# Patient Record
Sex: MALE | Race: WHITE | NOT HISPANIC OR LATINO
[De-identification: names, ages, dates, MRNs, and addresses within clinical notes are randomized per-mention and may not be internally consistent; named-entity substitution may affect disease eponyms.]

---

## 2019-04-08 ENCOUNTER — TRANSCRIPTION ENCOUNTER (OUTPATIENT)
Age: 64
End: 2019-04-08

## 2021-10-29 ENCOUNTER — EMERGENCY (EMERGENCY)
Facility: HOSPITAL | Age: 66
LOS: 1 days | Discharge: HOME | End: 2021-10-29
Attending: EMERGENCY MEDICINE
Payer: MEDICARE

## 2021-10-29 VITALS
WEIGHT: 184.09 LBS | RESPIRATION RATE: 18 BRPM | OXYGEN SATURATION: 96 % | TEMPERATURE: 99 F | HEIGHT: 69 IN | HEART RATE: 76 BPM | DIASTOLIC BLOOD PRESSURE: 81 MMHG | SYSTOLIC BLOOD PRESSURE: 161 MMHG

## 2021-10-29 PROCEDURE — 29125 APPL SHORT ARM SPLINT STATIC: CPT | Mod: LT

## 2021-10-29 PROCEDURE — 99284 EMERGENCY DEPT VISIT MOD MDM: CPT | Mod: 25

## 2021-10-29 RX ORDER — TETANUS TOXOID, REDUCED DIPHTHERIA TOXOID AND ACELLULAR PERTUSSIS VACCINE, ADSORBED 5; 2.5; 8; 8; 2.5 [IU]/.5ML; [IU]/.5ML; UG/.5ML; UG/.5ML; UG/.5ML
0.5 SUSPENSION INTRAMUSCULAR ONCE
Refills: 0 | Status: COMPLETED | OUTPATIENT
Start: 2021-10-29 | End: 2021-10-29

## 2021-10-29 RX ORDER — IBUPROFEN 200 MG
600 TABLET ORAL ONCE
Refills: 0 | Status: COMPLETED | OUTPATIENT
Start: 2021-10-29 | End: 2021-10-29

## 2021-10-29 RX ADMIN — Medication 600 MILLIGRAM(S): at 23:55

## 2021-10-29 RX ADMIN — TETANUS TOXOID, REDUCED DIPHTHERIA TOXOID AND ACELLULAR PERTUSSIS VACCINE, ADSORBED 0.5 MILLILITER(S): 5; 2.5; 8; 8; 2.5 SUSPENSION INTRAMUSCULAR at 23:55

## 2021-10-29 NOTE — ED PROVIDER NOTE - PROGRESS NOTE DETAILS
ZARINA Maurice NOTE: Radial fx splinted, placed in sling, follow up ortho or hand surgeon outpatient  Pt stable for d/c, reports improvement, VSS, tolerating PO, ambulatory.  Discussion includes results, plan, proper medication use/side effects, and return precautions. Pt advised to f/u with PMD 1-2 days and specialists discussed.  Pt verbalized understanding/agreement of plan.

## 2021-10-29 NOTE — ED PROVIDER NOTE - CARE PLAN
1 Principal Discharge DX:	Closed fracture of shaft of left radius  Secondary Diagnosis:	Elevated blood pressure reading

## 2021-10-29 NOTE — ED PROVIDER NOTE - OBJECTIVE STATEMENT
64 y/o M with PMHx HTN presents to ED c/o left wrist pain s/p FOOSH occurring tonight after slipping on stairs. Witnessed by wife. Denies head trauma, neck pain, back pain, syncope, LOC. No other acute injuries. Last known TDAP > 10 years ago. No analgesics taken prior to arrival.

## 2021-10-29 NOTE — ED PROVIDER NOTE - ATTENDING CONTRIBUTION TO CARE
I personally saw the patient with the PA, and completed the key components of the history and physical exam. I then discussed the management plan with the PA.   gen in nad resp clear cardoiac no murmur abd soft msk + ttp left wrist nrom neurovasc intact   agree with pa plan of care

## 2021-10-29 NOTE — ED PROVIDER NOTE - NSFOLLOWUPINSTRUCTIONS_ED_ALL_ED_FT
- Please follow up with your Primary Care Doctor in 1 - 2 days. If you cannot follow-up with your primary care doctor please return to the Emergency Department for any urgent issues.  - Seek immediate medical care for any new, worsening or concerning signs or symptoms.   - Take medications as directed, be sure to read all instructions on packaging  - If you have difficulty following up, please call: [phone] or go to [email or web address] to obtain a [facility] doctor or specialist who takes your insurance in your area.    Feel better!       Radial Fracture       A radial fracture is a break in the radius bone. The radius is a bone in the forearm, on the same side as the thumb. The forearm is the part of the arm that is between the elbow and the wrist. A radial fracture near the wrist (distal radialfracture) is the most common type of broken arm. A fracture can also occur near the elbow (radial head fracture).      What are the causes?  The most common cause of a radial fracture is falling with the arm outstretched. Other causes include:  •An accident, such as a car or bike accident.      •A hard, direct hit to the forearm.        What increases the risk?  You may be at greater risk for a radial fracture if you:  •Are female.      •Are an older adult.      •Play contact sports.      •Have a condition that causes your bones to become thin and brittle (osteoporosis).        What are the signs or symptoms?  A radial fracture causes pain immediately after the injury. Other signs and symptoms may include:  •An abnormal bend or bump in the arm (deformity).      •Swelling.      •Bruising.      •Numbness or tingling in your arm and hand.      •Limited movement of your arm and hand.        How is this diagnosed?  This condition may be diagnosed based on:  •Your symptoms and medical history.      •A physical exam.      •An X-ray.        How is this treated?  Treatment depends on how severe your fracture is, where it is, and how the pieces of the broken bone line up with each other (alignment).  •The first step may be for you to wear a temporary splint for a few days, until your swelling goes down. After the swelling goes down, you may get a cast, get a different type of splint, or have surgery.      •If your broken bone is in good alignment, you will need to wear a splint or cast for up to 6 weeks.    •If your broken bone is not aligned (is displaced), your health care provider will need to align the bone pieces. After alignment, you will need to wear a splint or cast for up to 6 weeks. To align your broken bone, your health care provider may:  •Move the bones back into position without surgery (closed reduction).      •Perform surgery to align the fracture and fix the bone pieces into place with metal screws, plates, or wires (open reduction and internal fixation, ORIF).      •Perform surgery to align the fracture and fix the bone pieces into place with pins that are attached to a stabilizing bar outside your skin (external fixation).      Treatment may also include:  •Having your cast changed after 2–3 weeks.      •Physical therapy.      •Follow-up visits and X-rays to make sure you are healing.        Follow these instructions at home:    If you have a splint:     •Wear it as told by your health care provider. Remove it only as told by your health care provider.       •Loosen the splint if your fingers tingle, become numb, or turn cold and blue.       •Keep the splint clean and dry.      If you have a cast:     • Do not stick anything inside the cast to scratch your skin. Doing that increases your risk for infection.       •Check the skin around the cast every day. Tell your health care provider about any concerns.       •You may put lotion on dry skin around the edges of the cast. Do not put lotion on the skin underneath the cast.      •Keep the cast clean and dry.      Bathing     • Do not take baths, swim, or use a hot tub until your health care provider approves. Ask your health care provider if you may take showers. You may only be allowed to take sponge baths.    •If your splint or cast is not waterproof:  •Do not let it get wet.      •Cover it with a watertight covering when you take a bath or a shower.        Activity     •  Do not lift anything with your injured arm.      • Do not use the injured arm to support your body weight until your health care provider says that you can.      •Ask your health care provider what activities are safe for you during recovery, and ask what activities you need to avoid.        Managing pain, stiffness, and swelling    •If directed, put ice on painful areas:   •If you have a removable splint, remove it as told by your health care provider.       •Put ice in a plastic bag.       •Place a towel between your skin and the bag, or between your cast and the bag.      •Leave the ice on for 20 minutes, 2–3 times a day.        • Move your fingers often to avoid stiffness and to lessen swelling.       •Raise (elevate) your arm above the level of your heart while you are sitting or lying down.      General instructions     • Do not put pressure on any part of the cast or splint until it is fully hardened, if applicable. This may take several hours.       •Take over-the-counter and prescription medicines only as told by your health care provider.      •  Do not drive until your health care provider approves. You should not drive or use heavy machinery while taking prescription pain medicine.       • Do not use any products that contain nicotine or tobacco, such as cigarettes and e-cigarettes. These can delay bone healing. If you need help quitting, ask your health care provider.      •Keep all follow-up visits as told by your health care provider. This is important.        Contact a health care provider if you have:    •Pain that does not get better with medicine.      •Swelling that gets worse.      •A bad smell coming from your cast.        Get help right away if:    •You cannot move your fingers.      •You have severe pain.    •Your fingers or your hand:  •Become numb, cold, or pale.      •Turn a bluish color.          Summary    •A radial fracture is a break in the radius bone. The radius is in the forearm, on the same side as the thumb.      •Treatment depends on how severe your fracture is, where it is, and how the pieces of the broken bone line up with each other.      •A splint or cast may be needed to help the fracture heal. A more severe break may require surgery.      This information is not intended to replace advice given to you by your health care provider. Make sure you discuss any questions you have with your health care provider. - Please follow up with your Primary Care Doctor in 1 - 2 days. If you cannot follow-up with your primary care doctor please return to the Emergency Department for any urgent issues.  - Seek immediate medical care for any new, worsening or concerning signs or symptoms.   - Take medications as directed, be sure to read all instructions on packaging  - If you have difficulty following up, please call: [phone] or go to [email or web address] to obtain a [facility] doctor or specialist who takes your insurance in your area.    Feel better!       Radial Fracture       A radial fracture is a break in the radius bone. The radius is a bone in the forearm, on the same side as the thumb. The forearm is the part of the arm that is between the elbow and the wrist. A radial fracture near the wrist (distal radialfracture) is the most common type of broken arm. A fracture can also occur near the elbow (radial head fracture).      What are the causes?  The most common cause of a radial fracture is falling with the arm outstretched. Other causes include:  •An accident, such as a car or bike accident.      •A hard, direct hit to the forearm.        What increases the risk?  You may be at greater risk for a radial fracture if you:  •Are female.      •Are an older adult.      •Play contact sports.      •Have a condition that causes your bones to become thin and brittle (osteoporosis).        What are the signs or symptoms?  A radial fracture causes pain immediately after the injury. Other signs and symptoms may include:  •An abnormal bend or bump in the arm (deformity).      •Swelling.      •Bruising.      •Numbness or tingling in your arm and hand.      •Limited movement of your arm and hand.        How is this diagnosed?  This condition may be diagnosed based on:  •Your symptoms and medical history.      •A physical exam.      •An X-ray.        How is this treated?  Treatment depends on how severe your fracture is, where it is, and how the pieces of the broken bone line up with each other (alignment).  •The first step may be for you to wear a temporary splint for a few days, until your swelling goes down. After the swelling goes down, you may get a cast, get a different type of splint, or have surgery.      •If your broken bone is in good alignment, you will need to wear a splint or cast for up to 6 weeks.    •If your broken bone is not aligned (is displaced), your health care provider will need to align the bone pieces. After alignment, you will need to wear a splint or cast for up to 6 weeks. To align your broken bone, your health care provider may:  •Move the bones back into position without surgery (closed reduction).      •Perform surgery to align the fracture and fix the bone pieces into place with metal screws, plates, or wires (open reduction and internal fixation, ORIF).      •Perform surgery to align the fracture and fix the bone pieces into place with pins that are attached to a stabilizing bar outside your skin (external fixation).      Treatment may also include:  •Having your cast changed after 2–3 weeks.      •Physical therapy.      •Follow-up visits and X-rays to make sure you are healing.        Follow these instructions at home:    If you have a splint:     •Wear it as told by your health care provider. Remove it only as told by your health care provider.       •Loosen the splint if your fingers tingle, become numb, or turn cold and blue.       •Keep the splint clean and dry.      If you have a cast:     • Do not stick anything inside the cast to scratch your skin. Doing that increases your risk for infection.       •Check the skin around the cast every day. Tell your health care provider about any concerns.       •You may put lotion on dry skin around the edges of the cast. Do not put lotion on the skin underneath the cast.      •Keep the cast clean and dry.      Bathing     • Do not take baths, swim, or use a hot tub until your health care provider approves. Ask your health care provider if you may take showers. You may only be allowed to take sponge baths.    •If your splint or cast is not waterproof:  •Do not let it get wet.      •Cover it with a watertight covering when you take a bath or a shower.        Activity     •  Do not lift anything with your injured arm.      • Do not use the injured arm to support your body weight until your health care provider says that you can.      •Ask your health care provider what activities are safe for you during recovery, and ask what activities you need to avoid.        Managing pain, stiffness, and swelling    •If directed, put ice on painful areas:   •If you have a removable splint, remove it as told by your health care provider.       •Put ice in a plastic bag.       •Place a towel between your skin and the bag, or between your cast and the bag.      •Leave the ice on for 20 minutes, 2–3 times a day.        • Move your fingers often to avoid stiffness and to lessen swelling.       •Raise (elevate) your arm above the level of your heart while you are sitting or lying down.      General instructions     • Do not put pressure on any part of the cast or splint until it is fully hardened, if applicable. This may take several hours.       •Take over-the-counter and prescription medicines only as told by your health care provider.      •  Do not drive until your health care provider approves. You should not drive or use heavy machinery while taking prescription pain medicine.       • Do not use any products that contain nicotine or tobacco, such as cigarettes and e-cigarettes. These can delay bone healing. If you need help quitting, ask your health care provider.      •Keep all follow-up visits as told by your health care provider. This is important.        Contact a health care provider if you have:    •Pain that does not get better with medicine.      •Swelling that gets worse.      •A bad smell coming from your cast.        Get help right away if:    •You cannot move your fingers.      •You have severe pain.    •Your fingers or your hand:  •Become numb, cold, or pale.      •Turn a bluish color.          Summary    •A radial fracture is a break in the radius bone. The radius is in the forearm, on the same side as the thumb.      •Treatment depends on how severe your fracture is, where it is, and how the pieces of the broken bone line up with each other.      •A splint or cast may be needed to help the fracture heal. A more severe break may require surgery.      This information is not intended to replace advice given to you by your health care provider. Make sure you discuss any questions you have with your health care provider.    - Your blood pressure reading was high while in ED, please monitor your blood pressure at home and follow up with your Primary Doctor    Hypertension, commonly called high blood pressure, is when the force of blood pumping through your arteries is too strong. Hypertension forces your heart to work harder to pump blood. Your arteries may become narrow or stiff. Having untreated or uncontrolled hypertension for a long period of time can cause heart attack, stroke, kidney disease, and other problems. If started on a medication, take exactly as prescribed by your health care professional.     SEEK IMMEDIATE MEDICAL CARE IF YOU HAVE ANY OF THE FOLLOWING SYMPTOMS: severe headache, changes in your vision, confusion, chest pain, abdominal pain, vomiting, or shortness of breath.

## 2021-10-29 NOTE — ED PROVIDER NOTE - PHYSICAL EXAMINATION
Vital signs noted, see flowsheet.  General: NAD, well appearing and non-toxic.  HEENT: NC/AT. MMM. No hemotympanum, Conjunctiva and sclera clear b/l.  EOMI. PERRL.  Neck: Soft and supple, full ROM without pain. No c-spine ttp   Cardiac: RRR. +S1/S2. Peripheral pulses 2+ and symmetric b/l.   Respiratory: Speaking in full sentences, no evidence of respiratory distress. Lungs CTA b/l, no wheezes/rhonchi/rales/stridor. No chest wall ttp   Abdomen: Soft, NTND. No guarding or rebound tenderness. No bruising. Pelvis stable   Back: Spine midline and non-tender. No CVAT.  Skin: Normal color for race, no evidence of laceration, rash, ecchymosis, cyanosis or jaundice.   Neuro: Awake, alert and oriented to person/place/time/situation. Moves all extremities spontaneously.  No focal deficits   Psych: Normal affect     LUE: Swelling to distal wrist, tenderness to distal wrist, volar wrist small abrasion no active bleeding, no snuffbox ttp, limited ROM of wrist secondary to pain, able to range elbow and digits. No ttp of elbow/digits. Sensation intact. Cap refill < 2 seconds

## 2021-10-29 NOTE — ED PROVIDER NOTE - PATIENT PORTAL LINK FT
You can access the FollowMyHealth Patient Portal offered by [facility] by registering at the following website: [email or web address]. By joining [facility]’s FollowMyHealth portal, you will also be able to view your health information using other applications (apps) compatible with our system.

## 2021-10-29 NOTE — ED PROVIDER NOTE - CARE PROVIDER_API CALL
Abiodun Mackey (MD)  Plastic Surgery; Surgery of the Hand  [address], [address]  [place] [address]  Phone: [phone]  Fax: [phone]  Follow Up Time: 4-6 Days

## 2021-10-29 NOTE — ED PROVIDER NOTE - CLINICAL SUMMARY MEDICAL DECISION MAKING FREE TEXT BOX
66 y/o M with PMHx HTN presents to ED c/o left wrist pain s/p FOOSH occurring tonight after slipping on stairs  -Will check Xray, give motrin, update TDAP

## 2021-10-29 NOTE — ED ADULT NURSE NOTE - OBJECTIVE STATEMENT
patient A&Ox3 in no acute distress c/o of L wrist pain and small abrasion patient had a fall on the stairs denied hitting head no LOC

## 2021-10-30 DIAGNOSIS — R03.0 ELEVATED BLOOD-PRESSURE READING, WITHOUT DIAGNOSIS OF HYPERTENSION: ICD-10-CM

## 2021-10-30 PROCEDURE — 73110 X-RAY EXAM OF WRIST: CPT | Mod: 26,LT

## 2021-10-30 PROCEDURE — 29125 APPL SHORT ARM SPLINT STATIC: CPT | Mod: LT

## 2021-10-30 PROCEDURE — 90471 IMMUNIZATION ADMIN: CPT

## 2021-10-30 PROCEDURE — 99283 EMERGENCY DEPT VISIT LOW MDM: CPT | Mod: 25

## 2021-10-30 PROCEDURE — 90715 TDAP VACCINE 7 YRS/> IM: CPT

## 2021-10-30 PROCEDURE — 73110 X-RAY EXAM OF WRIST: CPT

## 2021-10-30 RX ORDER — OXYCODONE AND ACETAMINOPHEN 5; 325 MG/1; MG/1
1 TABLET ORAL ONCE
Refills: 0 | Status: DISCONTINUED | OUTPATIENT
Start: 2021-10-30 | End: 2021-10-30

## 2021-10-30 RX ORDER — OXYCODONE HYDROCHLORIDE 5 MG/1
1 TABLET ORAL
Qty: 8 | Refills: 0
Start: 2021-10-30 | End: 2021-10-31

## 2021-10-30 RX ADMIN — OXYCODONE AND ACETAMINOPHEN 1 TABLET(S): 5; 325 TABLET ORAL at 00:58

## 2021-12-13 NOTE — ED PROVIDER NOTE - CARE PROVIDERS DIRECT ADDRESSES
DISCHARGE INSTRUCTIONS    1. Diet:  · Continue healthy diet  · Remember to stay well hydrated    2. Activity:  · Ok for light activities (walking, showering, etc) as tolerated  · Make an effort to be out of bed walking multiple times per day. Do not spend all day in bed or sleeping! Walking will reduce the chance of developing complications such as pneumonia or blood clots in your legs  · No heavy lifting >10 lbs, strenuous activity, or contact sports for at least 4 weeks following surgery, as these activities will increase risk of developing a hernia    3. Wound care / Showering:  · May shower with regular soap and water, but no soaking underwater (in pools, tubs, etc) until wounds fully healed and cleared by MD in clinic  · Skin glue: Allow the skin glue over your incisions to come off on its own. Glue will typically start to come off after 1-2 weeks. Ok to shower and lightly wash incisions but do not pick at them or peel the glue off prematurely    4. Driving:  · Do not drive while taking narcotic pain medications  · You may resume driving when you no longer require narcotics or sedating medications AND you are physically able to perform the movements necessary for safe driving (turning to check your blind spot, etc)    5. Medications:  · Prescription pain meds: Use narcotic (ie: opiod) pain meds if needed for severe pain only, but decrease the amount you use each day  · Over-the-counter pain meds: Unless instructed otherwise, ok to use over-the-counter meds such as tylenol (acetaminophen) and motrin (ibuprofen) as directed, BUT: Do not use motrin (or other \"NSAIDs\") more than 5 days in a row as this can cause bleeding / ulcers, and do not use tylenol at the same time as Percocet, Vicodin, or Norco as these medications contain tylenol  · Bowel regimen: Recommend using stool softeners (ex: colace) and / or light laxatives (ex: miralax or milk of magnesia) as needed until you resume regular bowel function,  especially if using narcotic pain medications which can cause constipation  · Continue other meds as directed in the \"Med Rec\" section of your discharge paperwork    6. Follow-up:  · In surgery clinic for post-op appointment in approximately two weeks. You are scheduled for your appointment on 12/28 at [facility] [address]. Please arrive 15 minutes prior to your appointment.   · Phone number for the surgery office at [facility] (office of Dr. Brown and colleagues) is [phone]  · Follow up with Primary Care Provider for ongoing treatment of other medical conditions and medication management  · Call MD or seek evaluation if you experience worsening pain, nausea/vomiting or trouble staying hydrated, inability to urinate or have a bowel movement, new redness, swelling, or foul-smelling drainage from incisions, fever (especially >101.5), questions about instructions, or any concern you do NOT feel is an emergency  · Call 911 or go to the nearest ER for difficulty breathing, passing-out / fainting (or feeling like you are about to pass-out), severe chest pain, massive bleeding from anywhere, or anything else you DO think may be an emergency         ,DirectAddress_Unknown

## 2022-06-02 ENCOUNTER — RESULT REVIEW (OUTPATIENT)
Age: 67
End: 2022-06-02

## 2022-11-02 ENCOUNTER — OFFICE (OUTPATIENT)
Dept: URBAN - METROPOLITAN AREA CLINIC 93 | Facility: CLINIC | Age: 67
Setting detail: OPHTHALMOLOGY
End: 2022-11-02
Payer: MEDICARE

## 2022-11-02 DIAGNOSIS — H43.811: ICD-10-CM

## 2022-11-02 DIAGNOSIS — H35.071: ICD-10-CM

## 2022-11-02 PROCEDURE — 92201 OPSCPY EXTND RTA DRAW UNI/BI: CPT | Performed by: OPHTHALMOLOGY

## 2022-11-02 PROCEDURE — 92235 FLUORESCEIN ANGRPH MLTIFRAME: CPT | Performed by: OPHTHALMOLOGY

## 2022-11-02 PROCEDURE — 99213 OFFICE O/P EST LOW 20 MIN: CPT | Performed by: OPHTHALMOLOGY

## 2022-11-02 ASSESSMENT — REFRACTION_MANIFEST
OD_ADD: +2.50
OS_CYLINDER: +2.50
OD_AXIS: 090
OS_ADD: +2.50
OD_SPHERE: +3.25
OS_AXIS: 090
OS_SPHERE: +3.00
OD_CYLINDER: +1.75

## 2022-11-02 ASSESSMENT — REFRACTION_AUTOREFRACTION
OS_CYLINDER: -2.25
OD_SPHERE: +5.50
OS_AXIS: 001
OS_SPHERE: +6.25
OD_CYLINDER: -1.75
OD_AXIS: 003

## 2022-11-02 ASSESSMENT — REFRACTION_CURRENTRX
OD_ADD: +1.75
OS_OVR_VA: 20/
OS_AXIS: 157
OS_CYLINDER: -1.75
OS_OVR_VA: 20/
OD_OVR_VA: 20/
OS_SPHERE: +2.75
OS_AXIS: 087
OS_SPHERE: +6.25
OD_AXIS: 097
OD_SPHERE: +6.25
OD_VPRISM_DIRECTION: PROGS
OD_CYLINDER: -2.00
OD_SPHERE: +3.25
OD_AXIS: 012
OS_VPRISM_DIRECTION: PROGS
OS_ADD: +2.00
OD_CYLINDER: +1.75
OD_OVR_VA: 20/
OS_CYLINDER: +2.50

## 2022-11-02 ASSESSMENT — SPHEQUIV_DERIVED
OS_SPHEQUIV: 5.125
OS_SPHEQUIV: 4.25
OD_SPHEQUIV: 4.125
OD_SPHEQUIV: 4.625

## 2022-11-02 ASSESSMENT — AXIALLENGTH_DERIVED
OD_AL: 22.3314
OS_AL: 22.247
OS_AL: 21.9479
OD_AL: 22.1582

## 2022-11-02 ASSESSMENT — KERATOMETRY
OS_AXISANGLE_DEGREES: 083
OD_K1POWER_DIOPTERS: 41.75
OS_K1POWER_DIOPTERS: 41.75
OD_AXISANGLE_DEGREES: 090
OS_K2POWER_DIOPTERS: 44.00
OD_K2POWER_DIOPTERS: 43.75

## 2022-11-02 ASSESSMENT — VISUAL ACUITY
OD_BCVA: 20/30+2
OS_BCVA: 20/20-1

## 2022-11-02 ASSESSMENT — CONFRONTATIONAL VISUAL FIELD TEST (CVF)
OD_FINDINGS: FULL
OS_FINDINGS: FULL

## 2022-11-02 ASSESSMENT — SUPERFICIAL PUNCTATE KERATITIS (SPK)
OS_SPK: 1+
OD_SPK: 2+

## 2023-01-06 PROBLEM — I10 ESSENTIAL (PRIMARY) HYPERTENSION: Chronic | Status: ACTIVE | Noted: 2021-10-29

## 2023-01-06 PROBLEM — Z00.00 ENCOUNTER FOR PREVENTIVE HEALTH EXAMINATION: Status: ACTIVE | Noted: 2023-01-06

## 2023-01-17 ENCOUNTER — APPOINTMENT (OUTPATIENT)
Dept: ORTHOPEDIC SURGERY | Facility: CLINIC | Age: 68
End: 2023-01-17
Payer: MEDICARE

## 2023-01-17 VITALS — HEIGHT: 70 IN | WEIGHT: 196 LBS | BODY MASS INDEX: 28.06 KG/M2

## 2023-01-17 DIAGNOSIS — Z78.9 OTHER SPECIFIED HEALTH STATUS: ICD-10-CM

## 2023-01-17 DIAGNOSIS — I10 ESSENTIAL (PRIMARY) HYPERTENSION: ICD-10-CM

## 2023-01-17 DIAGNOSIS — M72.0 PALMAR FASCIAL FIBROMATOSIS [DUPUYTREN]: ICD-10-CM

## 2023-01-17 DIAGNOSIS — E78.00 PURE HYPERCHOLESTEROLEMIA, UNSPECIFIED: ICD-10-CM

## 2023-01-17 PROCEDURE — 99203 OFFICE O/P NEW LOW 30 MIN: CPT

## 2023-01-17 RX ORDER — AMLODIPINE BESYLATE 5 MG/1
5 TABLET ORAL
Refills: 0 | Status: ACTIVE | COMMUNITY

## 2023-01-17 RX ORDER — SIMVASTATIN 40 MG/1
TABLET, FILM COATED ORAL
Refills: 0 | Status: ACTIVE | COMMUNITY

## 2023-01-17 NOTE — DISCUSSION/SUMMARY
[de-identified] : His surgery was over 1 year ago. he has completed therapy. Discussed he is likely at his final destination regarding his stiffness and motion, in fact his finger motion is excellent, near full. \par No intervention is needed for Danes at this time. \par PRN.

## 2023-01-17 NOTE — PHYSICAL EXAM
[Left] : left hand [] : no erythema [FreeTextEntry3] : Pretendinous chords to 2,3 and 4 \par No loss of extension\par All fingers reach DPC

## 2023-01-17 NOTE — REVIEW OF SYSTEMS
[Joint Stiffness] : joint stiffness [Joint Pain] : no joint pain [Joint Swelling] : no joint swelling

## 2023-01-17 NOTE — HISTORY OF PRESENT ILLNESS
[0] : 0 [Full time] : Work status: full time [de-identified] : 67 year old male presenting with LEFT wrist and finger stiffness. He reports he had a fx and had surgery for the fx November 2021.  [] : Post Surgical Visit: no [FreeTextEntry1] : L wrist  [FreeTextEntry3] : 10/2021 [FreeTextEntry5] : 68 Y/O LHD M eval L wrist. pain since DOI above due to HX of FX with associated ORIF most recent TX on 4/22/22. pt denies any pain  [de-identified] : None [de-identified] : [employer]

## 2023-01-24 ENCOUNTER — TRANSCRIPTION ENCOUNTER (OUTPATIENT)
Age: 68
End: 2023-01-24

## 2023-04-11 ENCOUNTER — OFFICE (OUTPATIENT)
Facility: LOCATION | Age: 68
Setting detail: OPHTHALMOLOGY
End: 2023-04-11
Payer: MEDICARE

## 2023-04-11 DIAGNOSIS — H25.13: ICD-10-CM

## 2023-04-11 PROCEDURE — 92012 INTRM OPH EXAM EST PATIENT: CPT | Performed by: OPHTHALMOLOGY

## 2023-04-11 ASSESSMENT — REFRACTION_CURRENTRX
OD_CYLINDER: +1.75
OD_CYLINDER: -2.00
OD_OVR_VA: 20/
OD_VPRISM_DIRECTION: PROGS
OS_CYLINDER: +2.50
OD_SPHERE: +6.25
OS_OVR_VA: 20/
OD_OVR_VA: 20/
OS_SPHERE: +6.25
OS_SPHERE: +2.75
OS_AXIS: 087
OD_AXIS: 097
OS_ADD: +2.00
OD_ADD: +1.75
OS_CYLINDER: -1.75
OD_SPHERE: +3.25
OD_AXIS: 012
OS_AXIS: 157
OS_VPRISM_DIRECTION: PROGS
OS_OVR_VA: 20/

## 2023-04-11 ASSESSMENT — KERATOMETRY
OD_K2POWER_DIOPTERS: 43.75
OS_AXISANGLE_DEGREES: 083
OS_K1POWER_DIOPTERS: 41.75
OD_K1POWER_DIOPTERS: 41.75
OS_K2POWER_DIOPTERS: 44.00
OD_AXISANGLE_DEGREES: 090

## 2023-04-11 ASSESSMENT — AXIALLENGTH_DERIVED
OS_AL: 22.247
OS_AL: 21.9479
OD_AL: 22.1582
OD_AL: 22.3314

## 2023-04-11 ASSESSMENT — REFRACTION_MANIFEST
OD_CYLINDER: +1.75
OS_SPHERE: +3.00
OS_ADD: +2.50
OD_ADD: +2.50
OS_CYLINDER: +2.50
OD_SPHERE: +3.25
OS_AXIS: 090
OD_AXIS: 090

## 2023-04-11 ASSESSMENT — CONFRONTATIONAL VISUAL FIELD TEST (CVF)
OD_FINDINGS: FULL
OS_FINDINGS: FULL

## 2023-04-11 ASSESSMENT — SPHEQUIV_DERIVED
OS_SPHEQUIV: 5.125
OD_SPHEQUIV: 4.125
OD_SPHEQUIV: 4.625
OS_SPHEQUIV: 4.25

## 2023-04-11 ASSESSMENT — VISUAL ACUITY
OD_BCVA: 20/25
OS_BCVA: 20/20-1

## 2023-04-11 ASSESSMENT — REFRACTION_AUTOREFRACTION
OD_AXIS: 003
OS_CYLINDER: -2.25
OD_SPHERE: +5.50
OS_AXIS: 001
OS_SPHERE: +6.25
OD_CYLINDER: -1.75

## 2023-04-11 ASSESSMENT — SUPERFICIAL PUNCTATE KERATITIS (SPK)
OS_SPK: 1+
OD_SPK: 2+

## 2023-10-11 ENCOUNTER — OFFICE (OUTPATIENT)
Facility: LOCATION | Age: 68
Setting detail: OPHTHALMOLOGY
End: 2023-10-11
Payer: MEDICARE

## 2023-10-11 DIAGNOSIS — H25.13: ICD-10-CM

## 2023-10-11 DIAGNOSIS — H16.223: ICD-10-CM

## 2023-10-11 PROCEDURE — 99212 OFFICE O/P EST SF 10 MIN: CPT | Performed by: OPHTHALMOLOGY

## 2023-10-11 ASSESSMENT — SUPERFICIAL PUNCTATE KERATITIS (SPK)
OS_SPK: 1+
OD_SPK: 2+

## 2023-10-11 ASSESSMENT — REFRACTION_MANIFEST
OD_SPHERE: +3.25
OD_ADD: +2.50
OD_CYLINDER: +1.75
OS_AXIS: 090
OS_SPHERE: +3.00
OD_AXIS: 090
OS_CYLINDER: +2.50
OS_ADD: +2.50

## 2023-10-11 ASSESSMENT — CONFRONTATIONAL VISUAL FIELD TEST (CVF)
OD_FINDINGS: FULL
OS_FINDINGS: FULL

## 2023-10-11 ASSESSMENT — AXIALLENGTH_DERIVED
OS_AL: 21.7853
OD_AL: 21.9925
OD_AL: 22.2494
OS_AL: 22.1656

## 2023-10-11 ASSESSMENT — REFRACTION_CURRENTRX
OS_SPHERE: +5.25
OD_ADD: +2.50
OS_CYLINDER: -2.25
OD_AXIS: 012
OD_SPHERE: +6.25
OD_VPRISM_DIRECTION: PROGS
OD_OVR_VA: 20/
OD_CYLINDER: -1.75
OD_OVR_VA: 20/
OS_CYLINDER: -1.75
OD_AXIS: 015
OS_ADD: +2.50
OS_OVR_VA: 20/
OS_VPRISM_DIRECTION: PROGS
OD_SPHERE: +5.00
OS_AXIS: 180
OS_OVR_VA: 20/
OS_AXIS: 157
OD_CYLINDER: -2.00
OS_SPHERE: +6.25

## 2023-10-11 ASSESSMENT — REFRACTION_AUTOREFRACTION
OS_AXIS: 004
OD_AXIS: 001
OD_CYLINDER: -1.75
OS_SPHERE: +6.00
OS_CYLINDER: -1.25
OD_SPHERE: +5.75

## 2023-10-11 ASSESSMENT — SPHEQUIV_DERIVED
OS_SPHEQUIV: 4.25
OD_SPHEQUIV: 4.875
OD_SPHEQUIV: 4.125
OS_SPHEQUIV: 5.375

## 2023-10-11 ASSESSMENT — KERATOMETRY
OS_AXISANGLE_DEGREES: 083
OS_K2POWER_DIOPTERS: 44.25
OD_AXISANGLE_DEGREES: 091
OD_K2POWER_DIOPTERS: 44.00
OD_K1POWER_DIOPTERS: 42.00
OS_K1POWER_DIOPTERS: 42.00

## 2023-10-11 ASSESSMENT — VISUAL ACUITY
OD_BCVA: 20/40+
OS_BCVA: 20/30

## 2024-10-21 ENCOUNTER — OFFICE (OUTPATIENT)
Facility: LOCATION | Age: 69
Setting detail: OPHTHALMOLOGY
End: 2024-10-21
Payer: MEDICARE

## 2024-10-21 DIAGNOSIS — H43.811: ICD-10-CM

## 2024-10-21 DIAGNOSIS — H35.071: ICD-10-CM

## 2024-10-21 DIAGNOSIS — H16.223: ICD-10-CM

## 2024-10-21 DIAGNOSIS — H25.13: ICD-10-CM

## 2024-10-21 PROCEDURE — 92250 FUNDUS PHOTOGRAPHY W/I&R: CPT | Performed by: OPHTHALMOLOGY

## 2024-10-21 PROCEDURE — 92014 COMPRE OPH EXAM EST PT 1/>: CPT | Performed by: OPHTHALMOLOGY

## 2024-10-21 ASSESSMENT — CONFRONTATIONAL VISUAL FIELD TEST (CVF)
OD_FINDINGS: FULL
OS_FINDINGS: FULL

## 2024-10-21 ASSESSMENT — REFRACTION_MANIFEST
OS_AXIS: 090
OS_CYLINDER: +2.50
OS_SPHERE: +3.00
OS_ADD: +2.50
OD_ADD: +2.50
OD_SPHERE: +3.25
OD_CYLINDER: +1.75
OD_AXIS: 090

## 2024-10-21 ASSESSMENT — KERATOMETRY
OS_AXISANGLE_DEGREES: 083
OS_K1POWER_DIOPTERS: 42.00
OS_K2POWER_DIOPTERS: 44.25
OD_AXISANGLE_DEGREES: 098
OD_K2POWER_DIOPTERS: 43.50
OD_K1POWER_DIOPTERS: 42.25

## 2024-10-21 ASSESSMENT — REFRACTION_CURRENTRX
OS_OVR_VA: 20/
OS_OVR_VA: 20/
OD_ADD: +2.25
OS_SPHERE: +5.25
OS_VPRISM_DIRECTION: PROGS
OD_OVR_VA: 20/
OS_AXIS: 178
OD_VPRISM_DIRECTION: PROGS
OS_CYLINDER: -2.00
OD_AXIS: 017
OD_CYLINDER: -1.75
OD_OVR_VA: 20/
OD_SPHERE: +5.00
OS_ADD: +2.25

## 2024-10-21 ASSESSMENT — REFRACTION_AUTOREFRACTION
OD_SPHERE: +5.50
OS_AXIS: 175
OD_CYLINDER: -2.00
OD_AXIS: 006
OS_CYLINDER: -2.00
OS_SPHERE: +5.75

## 2024-10-21 ASSESSMENT — SUPERFICIAL PUNCTATE KERATITIS (SPK)
OS_SPK: 1+
OD_SPK: 2+

## 2025-01-30 ENCOUNTER — OFFICE (OUTPATIENT)
Facility: LOCATION | Age: 70
Setting detail: OPHTHALMOLOGY
End: 2025-01-30
Payer: MEDICARE

## 2025-01-30 DIAGNOSIS — H25.11: ICD-10-CM

## 2025-01-30 DIAGNOSIS — H35.071: ICD-10-CM

## 2025-01-30 DIAGNOSIS — H25.13: ICD-10-CM

## 2025-01-30 DIAGNOSIS — H16.223: ICD-10-CM

## 2025-01-30 DIAGNOSIS — H43.811: ICD-10-CM

## 2025-01-30 PROBLEM — H25.12 CATARACT SENILE NUCLEAR SCLEROSIS; RIGHT EYE, LEFT EYE, BOTH EYES: Status: ACTIVE | Noted: 2025-01-30

## 2025-01-30 PROCEDURE — 92136 OPHTHALMIC BIOMETRY: CPT | Mod: 26,RT | Performed by: OPHTHALMOLOGY

## 2025-01-30 PROCEDURE — 92250 FUNDUS PHOTOGRAPHY W/I&R: CPT | Performed by: OPHTHALMOLOGY

## 2025-01-30 PROCEDURE — 92136 OPHTHALMIC BIOMETRY: CPT | Mod: TC | Performed by: OPHTHALMOLOGY

## 2025-01-30 PROCEDURE — 99214 OFFICE O/P EST MOD 30 MIN: CPT | Performed by: OPHTHALMOLOGY

## 2025-01-30 ASSESSMENT — REFRACTION_MANIFEST
OS_AXIS: 090
OS_ADD: +2.50
OD_ADD: +2.50
OD_CYLINDER: +1.75
OS_CYLINDER: +2.50
OS_SPHERE: +3.00
OD_SPHERE: +3.25
OD_AXIS: 090

## 2025-01-30 ASSESSMENT — REFRACTION_CURRENTRX
OD_AXIS: 015
OD_SPHERE: +5.00
OD_OVR_VA: 20/
OS_OVR_VA: 20/
OS_CYLINDER: -2.00
OS_SPHERE: +5.25
OD_ADD: +2.50
OD_VPRISM_DIRECTION: PROGS
OS_ADD: +2.50
OS_VPRISM_DIRECTION: PROGS
OS_AXIS: 180
OD_CYLINDER: -1.75

## 2025-01-30 ASSESSMENT — KERATOMETRY
OD_K1K2_AVERAGE: 42.875
OS_K1POWER_DIOPTERS: 42.00
OS_K1K2_AVERAGE: 43.125
OS_CYLPOWER_DEGREES: 2.25
OD_AXISANGLE_DEGREES: 098
OD_AXISANGLE_DEGREES: 8
OS_CYLAXISANGLE_DEGREES: 083
OD_K1POWER_DIOPTERS: 42.25
OS_AXISANGLE_DEGREES: 083
OS_AXISANGLE2_DEGREES: 083
OD_CYLPOWER_DEGREES: 1.25
OS_K2POWER_DIOPTERS: 44.25
OD_AXISANGLE2_DEGREES: 098
OS_AXISANGLE_DEGREES: 173
OD_K1POWER_DIOPTERS: 42.25
OD_K2POWER_DIOPTERS: 43.50
OD_CYLAXISANGLE_DEGREES: 098
OS_K2POWER_DIOPTERS: 44.25
OS_K1POWER_DIOPTERS: 42.00
OD_K2POWER_DIOPTERS: 43.50

## 2025-01-30 ASSESSMENT — REFRACTION_AUTOREFRACTION
OD_SPHERE: +5.25
OS_SPHERE: +6.00
OD_CYLINDER: -1.00
OD_AXIS: 180
OS_AXIS: 005
OS_CYLINDER: -1.25

## 2025-01-30 ASSESSMENT — CONFRONTATIONAL VISUAL FIELD TEST (CVF)
OD_FINDINGS: FULL
OS_FINDINGS: FULL

## 2025-01-30 ASSESSMENT — SUPERFICIAL PUNCTATE KERATITIS (SPK)
OD_SPK: 2+
OS_SPK: 1+

## 2025-02-04 ENCOUNTER — ASC (OUTPATIENT)
Dept: URBAN - METROPOLITAN AREA SURGERY 8 | Facility: SURGERY | Age: 70
Setting detail: OPHTHALMOLOGY
End: 2025-02-04
Payer: MEDICARE

## 2025-02-04 DIAGNOSIS — H52.221: ICD-10-CM

## 2025-02-04 DIAGNOSIS — H25.11: ICD-10-CM

## 2025-02-04 PROCEDURE — 66984 XCAPSL CTRC RMVL W/O ECP: CPT | Mod: RT | Performed by: OPHTHALMOLOGY

## 2025-02-04 PROCEDURE — FEMTO PRECISION LASER CATARACT SURGERY: Mod: GY | Performed by: OPHTHALMOLOGY

## 2025-02-04 PROCEDURE — S9986 NOT MEDICALLY NECESSARY SVC: HCPCS | Mod: GX,GY | Performed by: OPHTHALMOLOGY

## 2025-02-04 PROCEDURE — 68841 INSJ RX ELUT IMPLT LAC CANAL: CPT | Mod: RT | Performed by: OPHTHALMOLOGY

## 2025-02-05 ENCOUNTER — OFFICE (OUTPATIENT)
Facility: LOCATION | Age: 70
Setting detail: OPHTHALMOLOGY
End: 2025-02-05
Payer: MEDICARE

## 2025-02-05 ENCOUNTER — RX ONLY (RX ONLY)
Age: 70
End: 2025-02-05

## 2025-02-05 DIAGNOSIS — H25.12: ICD-10-CM

## 2025-02-05 PROCEDURE — 92136 OPHTHALMIC BIOMETRY: CPT | Mod: 26,LT | Performed by: OPHTHALMOLOGY

## 2025-02-05 PROCEDURE — 99214 OFFICE O/P EST MOD 30 MIN: CPT | Mod: 24 | Performed by: OPHTHALMOLOGY

## 2025-02-05 ASSESSMENT — KERATOMETRY
OD_K2POWER_DIOPTERS: 43.50
OS_AXISANGLE_DEGREES: 085
OS_K2POWER_DIOPTERS: 44.50
OS_K1POWER_DIOPTERS: 42.00
OD_K1POWER_DIOPTERS: 42.50
OD_AXISANGLE_DEGREES: 108

## 2025-02-05 ASSESSMENT — REFRACTION_AUTOREFRACTION
OS_AXIS: 175
OS_SPHERE: +6.00
OD_SPHERE: +0.50
OS_CYLINDER: -2.00
OD_CYLINDER: -1.00
OD_AXIS: 025

## 2025-02-05 ASSESSMENT — SUPERFICIAL PUNCTATE KERATITIS (SPK)
OS_SPK: 1+
OD_SPK: 2+

## 2025-02-05 ASSESSMENT — REFRACTION_CURRENTRX
OS_SPHERE: +5.25
OS_AXIS: 180
OS_ADD: +2.50
OS_OVR_VA: 20/
OD_CYLINDER: -1.75
OS_VPRISM_DIRECTION: PROGS
OD_ADD: +2.50
OD_SPHERE: +5.00
OD_OVR_VA: 20/
OD_AXIS: 015
OS_CYLINDER: -2.00
OD_VPRISM_DIRECTION: PROGS

## 2025-02-05 ASSESSMENT — REFRACTION_MANIFEST
OS_SPHERE: +3.00
OD_SPHERE: +3.25
OD_CYLINDER: +1.75
OD_ADD: +2.50
OS_ADD: +2.50
OD_AXIS: 090
OS_AXIS: 090
OS_CYLINDER: +2.50

## 2025-02-05 ASSESSMENT — VISUAL ACUITY
OS_BCVA: 20/25+2
OD_BCVA: 20/40+2

## 2025-02-05 ASSESSMENT — CONFRONTATIONAL VISUAL FIELD TEST (CVF)
OD_FINDINGS: FULL
OS_FINDINGS: FULL

## 2025-02-18 ENCOUNTER — ASC (OUTPATIENT)
Dept: URBAN - METROPOLITAN AREA SURGERY 8 | Facility: SURGERY | Age: 70
Setting detail: OPHTHALMOLOGY
End: 2025-02-18
Payer: MEDICARE

## 2025-02-18 DIAGNOSIS — H52.222: ICD-10-CM

## 2025-02-18 DIAGNOSIS — H25.12: ICD-10-CM

## 2025-02-18 PROCEDURE — 66984 XCAPSL CTRC RMVL W/O ECP: CPT | Mod: 79,LT | Performed by: OPHTHALMOLOGY

## 2025-02-18 PROCEDURE — S9986 NOT MEDICALLY NECESSARY SVC: HCPCS | Mod: GX,GY | Performed by: OPHTHALMOLOGY

## 2025-02-18 PROCEDURE — FEMTO PRECISION LASER CATARACT SURGERY: Mod: GY | Performed by: OPHTHALMOLOGY

## 2025-02-18 PROCEDURE — 68841 INSJ RX ELUT IMPLT LAC CANAL: CPT | Mod: 79,LT | Performed by: OPHTHALMOLOGY

## 2025-02-19 ENCOUNTER — RX ONLY (RX ONLY)
Age: 70
End: 2025-02-19

## 2025-02-19 ENCOUNTER — OFFICE (OUTPATIENT)
Facility: LOCATION | Age: 70
Setting detail: OPHTHALMOLOGY
End: 2025-02-19
Payer: MEDICARE

## 2025-02-19 DIAGNOSIS — Z96.1: ICD-10-CM

## 2025-02-19 PROCEDURE — 99024 POSTOP FOLLOW-UP VISIT: CPT | Performed by: OPHTHALMOLOGY

## 2025-02-19 ASSESSMENT — REFRACTION_AUTOREFRACTION
OD_AXIS: 025
OS_AXIS: 175
OS_CYLINDER: -2.00
OD_CYLINDER: -1.00
OD_SPHERE: +0.50
OS_SPHERE: +6.00

## 2025-02-19 ASSESSMENT — REFRACTION_CURRENTRX
OD_OVR_VA: 20/
OS_OVR_VA: 20/
OD_VPRISM_DIRECTION: PROGS
OD_AXIS: 015
OD_ADD: +2.50
OS_VPRISM_DIRECTION: PROGS
OD_CYLINDER: -1.75
OS_SPHERE: +5.25
OS_AXIS: 180
OS_ADD: +2.50
OD_SPHERE: +5.00
OS_CYLINDER: -2.00

## 2025-02-19 ASSESSMENT — KERATOMETRY
OD_AXISANGLE_DEGREES: 108
OS_AXISANGLE_DEGREES: 085
OD_K1POWER_DIOPTERS: 42.50
OS_K1POWER_DIOPTERS: 42.00
OD_K2POWER_DIOPTERS: 43.50
OS_K2POWER_DIOPTERS: 44.50

## 2025-02-19 ASSESSMENT — CONFRONTATIONAL VISUAL FIELD TEST (CVF)
OS_FINDINGS: FULL
OD_FINDINGS: FULL

## 2025-02-19 ASSESSMENT — CORNEAL EDEMA CLINICAL DESCRIPTION: OS_CORNEALEDEMA: 1+

## 2025-02-19 ASSESSMENT — VISUAL ACUITY
OD_BCVA: 20/40+2
OS_BCVA: 20/20-2

## 2025-02-19 ASSESSMENT — REFRACTION_MANIFEST
OS_CYLINDER: +2.50
OD_AXIS: 090
OS_ADD: +2.50
OS_SPHERE: +3.00
OS_AXIS: 090
OD_SPHERE: +3.25
OD_CYLINDER: +1.75
OD_ADD: +2.50

## 2025-02-19 ASSESSMENT — SUPERFICIAL PUNCTATE KERATITIS (SPK)
OS_SPK: 1+
OD_SPK: 2+

## 2025-03-19 ENCOUNTER — OFFICE (OUTPATIENT)
Facility: LOCATION | Age: 70
Setting detail: OPHTHALMOLOGY
End: 2025-03-19
Payer: MEDICARE

## 2025-03-19 DIAGNOSIS — Z96.1: ICD-10-CM

## 2025-03-19 DIAGNOSIS — H16.223: ICD-10-CM

## 2025-03-19 PROCEDURE — 99024 POSTOP FOLLOW-UP VISIT: CPT | Performed by: OPHTHALMOLOGY

## 2025-03-19 ASSESSMENT — REFRACTION_CURRENTRX
OS_ADD: +2.50
OD_VPRISM_DIRECTION: PROGS
OD_SPHERE: +5.00
OS_OVR_VA: 20/
OD_OVR_VA: 20/
OD_CYLINDER: -1.75
OS_VPRISM_DIRECTION: PROGS
OS_SPHERE: +5.25
OS_CYLINDER: -2.00
OD_AXIS: 015
OS_AXIS: 180
OD_ADD: +2.50

## 2025-03-19 ASSESSMENT — REFRACTION_MANIFEST
OS_ADD: +2.50
OD_AXIS: 090
OS_AXIS: 090
OS_CYLINDER: +2.50
OD_ADD: +2.50
OD_CYLINDER: +1.75
OD_SPHERE: +3.25
OS_SPHERE: +3.00

## 2025-03-19 ASSESSMENT — VISUAL ACUITY
OD_BCVA: 20/20-3
OS_BCVA: 20/20-1

## 2025-03-19 ASSESSMENT — CONFRONTATIONAL VISUAL FIELD TEST (CVF)
OD_FINDINGS: FULL
OS_FINDINGS: FULL

## 2025-03-19 ASSESSMENT — SUPERFICIAL PUNCTATE KERATITIS (SPK)
OD_SPK: ABSENT
OS_SPK: ABSENT

## 2025-03-19 ASSESSMENT — REFRACTION_AUTOREFRACTION
OD_SPHERE: +1.00
OS_CYLINDER: -1.75
OS_SPHERE: +1.50
OD_CYLINDER: -1.50
OD_AXIS: 019
OS_AXIS: 161

## 2025-03-19 ASSESSMENT — KERATOMETRY
OS_AXISANGLE_DEGREES: 077
OD_AXISANGLE_DEGREES: 099
OS_K2POWER_DIOPTERS: 44.00
OD_K1POWER_DIOPTERS: 42.25
METHOD_AUTO_MANUAL: AUTO
OS_K1POWER_DIOPTERS: 42.00
OD_K2POWER_DIOPTERS: 43.50

## 2025-03-19 ASSESSMENT — CORNEAL EDEMA CLINICAL DESCRIPTION: OS_CORNEALEDEMA: ABSENT

## 2025-04-21 ENCOUNTER — RX ONLY (RX ONLY)
Age: 70
End: 2025-04-21

## 2025-04-21 ENCOUNTER — OFFICE (OUTPATIENT)
Facility: LOCATION | Age: 70
Setting detail: OPHTHALMOLOGY
End: 2025-04-21
Payer: MEDICARE

## 2025-04-21 DIAGNOSIS — Z96.1: ICD-10-CM

## 2025-04-21 DIAGNOSIS — H16.223: ICD-10-CM

## 2025-04-21 PROCEDURE — 99212 OFFICE O/P EST SF 10 MIN: CPT | Performed by: OPHTHALMOLOGY

## 2025-04-21 ASSESSMENT — REFRACTION_CURRENTRX
OD_OVR_VA: 20/
OD_ADD: +2.50
OD_VPRISM_DIRECTION: PROGS
OS_AXIS: 180
OS_SPHERE: +5.25
OD_AXIS: 015
OS_ADD: +2.50
OS_CYLINDER: -2.00
OD_CYLINDER: -1.75
OS_OVR_VA: 20/
OD_SPHERE: +5.00
OS_VPRISM_DIRECTION: PROGS

## 2025-04-21 ASSESSMENT — CONFRONTATIONAL VISUAL FIELD TEST (CVF)
OS_FINDINGS: FULL
OD_FINDINGS: FULL

## 2025-04-21 ASSESSMENT — KERATOMETRY
OS_K2POWER_DIOPTERS: 44.25
OD_AXISANGLE_DEGREES: 100
OS_AXISANGLE_DEGREES: 080
OD_K1POWER_DIOPTERS: 42.25
OS_K1POWER_DIOPTERS: 42.00
METHOD_AUTO_MANUAL: AUTO
OD_K2POWER_DIOPTERS: 43.00

## 2025-04-21 ASSESSMENT — REFRACTION_MANIFEST
OD_ADD: +2.50
OS_AXIS: 090
OD_CYLINDER: +1.75
OD_AXIS: 090
OD_SPHERE: +3.25
OS_ADD: +2.50
OS_CYLINDER: +2.50
OS_SPHERE: +3.00

## 2025-04-21 ASSESSMENT — REFRACTION_AUTOREFRACTION
OD_AXIS: 015
OD_CYLINDER: -1.25
OS_SPHERE: +1.75
OS_AXIS: 161
OS_CYLINDER: -2.00
OD_SPHERE: +1.25

## 2025-04-21 ASSESSMENT — VISUAL ACUITY
OS_BCVA: 20/20-1
OD_BCVA: 20/25+2

## 2025-06-23 ENCOUNTER — OFFICE (OUTPATIENT)
Facility: LOCATION | Age: 70
Setting detail: OPHTHALMOLOGY
End: 2025-06-23
Payer: MEDICARE

## 2025-06-23 DIAGNOSIS — H16.223: ICD-10-CM

## 2025-06-23 DIAGNOSIS — H43.811: ICD-10-CM

## 2025-06-23 PROCEDURE — 92014 COMPRE OPH EXAM EST PT 1/>: CPT | Performed by: OPHTHALMOLOGY

## 2025-06-23 ASSESSMENT — KERATOMETRY
OD_K2POWER_DIOPTERS: 43.00
OD_K1POWER_DIOPTERS: 42.25
OS_AXISANGLE_DEGREES: 080
METHOD_AUTO_MANUAL: AUTO
OS_K2POWER_DIOPTERS: 44.25
OS_K1POWER_DIOPTERS: 42.00
OD_AXISANGLE_DEGREES: 100

## 2025-06-23 ASSESSMENT — REFRACTION_MANIFEST
OD_VA1: 20/25
OS_CYLINDER: -0.75
OD_CYLINDER: +1.75
OS_AXIS: 150
OS_SPHERE: +0.25
OS_VA1: 20/30
OD_ADD: +2.50
OD_SPHERE: +0.25
OD_AXIS: 075
OD_AXIS: 090
OD_SPHERE: +3.25
OD_CYLINDER: -0.75
OS_CYLINDER: +2.50
OS_ADD: +2.50
OS_SPHERE: +3.00
OS_AXIS: 090

## 2025-06-23 ASSESSMENT — REFRACTION_AUTOREFRACTION
OS_CYLINDER: -2.00
OD_SPHERE: +1.25
OD_CYLINDER: -1.25
OS_AXIS: 161
OS_SPHERE: +1.75
OD_AXIS: 015

## 2025-06-23 ASSESSMENT — REFRACTION_CURRENTRX
OD_VPRISM_DIRECTION: PROGS
OD_OVR_VA: 20/
OD_ADD: +2.50
OD_AXIS: 015
OS_ADD: +2.50
OD_SPHERE: +5.00
OS_SPHERE: +5.25
OD_CYLINDER: -1.75
OS_CYLINDER: -2.00
OS_AXIS: 180
OS_VPRISM_DIRECTION: PROGS
OS_OVR_VA: 20/

## 2025-06-23 ASSESSMENT — CONFRONTATIONAL VISUAL FIELD TEST (CVF)
OS_FINDINGS: FULL
OD_FINDINGS: FULL

## 2025-06-23 ASSESSMENT — VISUAL ACUITY
OS_BCVA: 20/25-2
OD_BCVA: 20/40

## 2025-07-13 ENCOUNTER — NON-APPOINTMENT (OUTPATIENT)
Age: 70
End: 2025-07-13